# Patient Record
Sex: FEMALE | URBAN - METROPOLITAN AREA
[De-identification: names, ages, dates, MRNs, and addresses within clinical notes are randomized per-mention and may not be internally consistent; named-entity substitution may affect disease eponyms.]

---

## 2023-05-26 ENCOUNTER — WEB ENCOUNTER (OUTPATIENT)
Dept: URBAN - METROPOLITAN AREA CLINIC 105 | Facility: CLINIC | Age: 48
End: 2023-05-26

## 2023-05-26 ENCOUNTER — OFFICE VISIT (OUTPATIENT)
Dept: URBAN - METROPOLITAN AREA CLINIC 105 | Facility: CLINIC | Age: 48
End: 2023-05-26
Payer: MEDICARE

## 2023-05-26 ENCOUNTER — DASHBOARD ENCOUNTERS (OUTPATIENT)
Age: 48
End: 2023-05-26

## 2023-05-26 VITALS
BODY MASS INDEX: 44.41 KG/M2 | HEIGHT: 68 IN | DIASTOLIC BLOOD PRESSURE: 76 MMHG | TEMPERATURE: 97.5 F | HEART RATE: 84 BPM | WEIGHT: 293 LBS | SYSTOLIC BLOOD PRESSURE: 137 MMHG

## 2023-05-26 DIAGNOSIS — I10 ESSENTIAL HYPERTENSION: ICD-10-CM

## 2023-05-26 DIAGNOSIS — K92.1 BLACK STOOLS: ICD-10-CM

## 2023-05-26 DIAGNOSIS — D50.8 ACQUIRED IRON DEFICIENCY ANEMIA DUE TO DECREASED ABSORPTION: ICD-10-CM

## 2023-05-26 DIAGNOSIS — E66.01 MORBID OBESITY: ICD-10-CM

## 2023-05-26 PROBLEM — 238136002: Status: ACTIVE | Noted: 2023-05-26

## 2023-05-26 PROBLEM — 77176002: Status: ACTIVE | Noted: 2023-05-26

## 2023-05-26 PROBLEM — 59621000: Status: ACTIVE | Noted: 2023-05-26

## 2023-05-26 PROBLEM — 234349007: Status: ACTIVE | Noted: 2023-05-26

## 2023-05-26 PROCEDURE — 99244 OFF/OP CNSLTJ NEW/EST MOD 40: CPT | Performed by: INTERNAL MEDICINE

## 2023-05-26 PROCEDURE — 99204 OFFICE O/P NEW MOD 45 MIN: CPT | Performed by: INTERNAL MEDICINE

## 2023-05-26 NOTE — HPI-TODAY'S VISIT:
5/26/23  46 yo lady pt of DR Govea/ REferred for black stools . A copy of this note will be sent to him.  c/o presternal and L flank pain starting about 3.5 weeks ago. Pain is present all the time, it gets dull and then has exacerbations. Pain feels like someone is poking her on her side. Pain improves with positioning herself a different way. Stretching makes the pain worse.  No nausea or vomiting. No relationship to meals.  She has regular BMs every night. Says she normally will have a BM 3 times a day. She is no longer having a BM in the am recently Says she had a bleeding episode out of her nose, mouth and eyes. Says "they dont know why I am bleeding" She was also having black stools "that is why they sent me to you" Stools are no longer black in the past week. Now green and black.  NO fhx of colon CA.  Never had a colonoscopy.  Denies AC, no ASA, used to take nsaids for pain in the not so distant past prn. Says "more frequently than a normal person should". Her MD told her to stop after her bleeding episode.  She has seen an ENT MD last week. "he didnt seen any vessels burst in my nose Saw some blood behind her ears but did not see where she was bleeding from Labs from 5/9/23 show Hb 7.8, mcv 75, CR 1.21  She is not on iron. told to take this "they said it would help with my breathing" When asked about breathing she says she can barely breath. Says this is with exertion.  She is a smoker but has not smoked since earlier this month.

## 2023-05-27 LAB
ABSOLUTE BASOPHILS: 32
ABSOLUTE EOSINOPHILS: 162
ABSOLUTE LYMPHOCYTES: 3121
ABSOLUTE MONOCYTES: 810
ABSOLUTE NEUTROPHILS: 6674
BASOPHILS: 0.3
EOSINOPHILS: 1.5
FERRITIN, SERUM: 10
HEMATOCRIT: 25.3
HEMOGLOBIN: 7.6
IRON BIND.CAP.(TIBC): 381
IRON SATURATION: 7
IRON: 25
LYMPHOCYTES: 28.9
MCH: 21.3
MCHC: 30
MCV: 70.9
MONOCYTES: 7.5
MPV: 10.3
NEUTROPHILS: 61.8
PLATELET COUNT: 473
RDW: 17.2
RED BLOOD CELL COUNT: 3.57
WHITE BLOOD CELL COUNT: 10.8

## 2023-06-09 ENCOUNTER — OFFICE VISIT (OUTPATIENT)
Dept: URBAN - METROPOLITAN AREA CLINIC 105 | Facility: CLINIC | Age: 48
End: 2023-06-09

## 2024-02-20 NOTE — EXAM-PHYSICAL EXAM
Call went straight to , alternate phone number also unavailable. LMTCB.    FYI: Patient also have appointment with BRIE Lyle on 03/22/2024   Gen: Alert, oriented, in no distress Heent: no icterus, lips wnl Lungs: bilateral breath sounds CVS: first and second heart sounds Abdomen: full, soft, non tender Ext: no edema Joints: normal joints and symmetry Spine: consistent with age Skin: no rash on exposed areas Neuro: no focal localizing signs